# Patient Record
Sex: MALE | Race: BLACK OR AFRICAN AMERICAN | Employment: FULL TIME | URBAN - METROPOLITAN AREA
[De-identification: names, ages, dates, MRNs, and addresses within clinical notes are randomized per-mention and may not be internally consistent; named-entity substitution may affect disease eponyms.]

---

## 2019-04-17 ENCOUNTER — HOSPITAL ENCOUNTER (EMERGENCY)
Age: 19
Discharge: HOME | End: 2019-04-17
Attending: EMERGENCY MEDICINE | Admitting: EMERGENCY MEDICINE
Payer: MEDICAID

## 2019-04-17 VITALS
SYSTOLIC BLOOD PRESSURE: 136 MMHG | DIASTOLIC BLOOD PRESSURE: 85 MMHG | OXYGEN SATURATION: 100 % | HEART RATE: 77 BPM | BODY MASS INDEX: 21.18 KG/M2 | TEMPERATURE: 98 F | HEIGHT: 69 IN | RESPIRATION RATE: 17 BRPM | WEIGHT: 143 LBS

## 2019-04-17 DIAGNOSIS — K02.9 DENTAL CARIES: ICD-10-CM

## 2019-04-17 DIAGNOSIS — R22.0 RIGHT FACIAL SWELLING: ICD-10-CM

## 2019-04-17 DIAGNOSIS — K08.89 DENTALGIA: Primary | ICD-10-CM

## 2019-04-17 PROCEDURE — 74011250636 HC RX REV CODE- 250/636: Performed by: EMERGENCY MEDICINE

## 2019-04-17 PROCEDURE — 99283 EMERGENCY DEPT VISIT LOW MDM: CPT

## 2019-04-17 PROCEDURE — 74011250637 HC RX REV CODE- 250/637: Performed by: PHYSICIAN ASSISTANT

## 2019-04-17 RX ORDER — HYDROCODONE BITARTRATE AND ACETAMINOPHEN 5; 325 MG/1; MG/1
1 TABLET ORAL
Status: COMPLETED | OUTPATIENT
Start: 2019-04-17 | End: 2019-04-17

## 2019-04-17 RX ORDER — HYDROCODONE BITARTRATE AND ACETAMINOPHEN 5; 325 MG/1; MG/1
1 TABLET ORAL
Qty: 12 TAB | Refills: 0 | Status: SHIPPED | OUTPATIENT
Start: 2019-04-17 | End: 2019-04-20

## 2019-04-17 RX ORDER — CLINDAMYCIN HYDROCHLORIDE 300 MG/1
300 CAPSULE ORAL 4 TIMES DAILY
Qty: 28 CAP | Refills: 0 | Status: SHIPPED | OUTPATIENT
Start: 2019-04-17 | End: 2019-04-24

## 2019-04-17 RX ORDER — DEXAMETHASONE 4 MG/1
12 TABLET ORAL
Status: COMPLETED | OUTPATIENT
Start: 2019-04-17 | End: 2019-04-17

## 2019-04-17 RX ORDER — CLINDAMYCIN HYDROCHLORIDE 150 MG/1
300 CAPSULE ORAL
Status: COMPLETED | OUTPATIENT
Start: 2019-04-17 | End: 2019-04-17

## 2019-04-17 RX ORDER — CHLORHEXIDINE GLUCONATE 1.2 MG/ML
15 RINSE ORAL 2 TIMES DAILY
Qty: 420 ML | Refills: 0 | Status: SHIPPED | OUTPATIENT
Start: 2019-04-17 | End: 2019-05-01

## 2019-04-17 RX ADMIN — CLINDAMYCIN HYDROCHLORIDE 300 MG: 150 CAPSULE ORAL at 18:41

## 2019-04-17 RX ADMIN — DEXAMETHASONE 12 MG: 4 TABLET ORAL at 19:18

## 2019-04-17 RX ADMIN — HYDROCODONE BITARTRATE AND ACETAMINOPHEN 1 TABLET: 5; 325 TABLET ORAL at 19:18

## 2019-04-17 NOTE — ED PROVIDER NOTES
EMERGENCY DEPARTMENT HISTORY AND PHYSICAL EXAM    Date: 4/17/2019  Patient Name: Raf Mckinney    History of Presenting Illness     Chief Complaint   Patient presents with    Dental Pain    Facial Swelling         History Provided By: Patient and mother     Chief Complaint: Dental pain and right cheek swelling   Duration: 1 day   Timing:  Acute   Location: right cheek and tooth 13  Quality: Aching and pressure   Severity: 9/10   Modifying Factors: none    Associated Symptoms: none       Additional History (Context): Raf Mckinney is a 22 y.o. male with no medical history who presents today for a 1 day history of right upper dental pain and right cheek swelling. Patient states he woke up this way after an afternoon nap. Patient has call the dentist but did not get until next week. Patient denies history of this. Denies fevers, chills, nausea or vomiting. Patient denies any trismus, tongue elevation, shortness of breath or changes in voice. Patient denies any drooling. Patient has not tried anything for this at home. PCP: Moshe Regalado MD    Current Facility-Administered Medications   Medication Dose Route Frequency Provider Last Rate Last Dose    dexamethasone (DECADRON) tablet 12 mg  12 mg Oral NOW Yane Breen MD         Current Outpatient Medications   Medication Sig Dispense Refill    clindamycin (CLEOCIN) 300 mg capsule Take 1 Cap by mouth four (4) times daily for 7 days. 28 Cap 0    chlorhexidine (PERIDEX) 0.12 % solution 15 mL by Swish and Spit route two (2) times a day for 14 days. 420 mL 0    HYDROcodone-acetaminophen (NORCO) 5-325 mg per tablet Take 1 Tab by mouth every six (6) hours as needed for Pain for up to 3 days. Max Daily Amount: 4 Tabs. 12 Tab 0       Past History     Past Medical History:  History reviewed. No pertinent past medical history. Past Surgical History:  History reviewed. No pertinent surgical history.     Family History:  History reviewed. No pertinent family history. Social History:  Social History     Tobacco Use    Smoking status: Never Smoker    Smokeless tobacco: Never Used   Substance Use Topics    Alcohol use: Not on file    Drug use: Not on file       Allergies:  No Known Allergies      Review of Systems   Review of Systems   Constitutional: Negative for chills and fever. HENT: Positive for dental problem. Negative for congestion, rhinorrhea and sore throat. Respiratory: Negative for cough and shortness of breath. Cardiovascular: Negative for chest pain. Gastrointestinal: Negative for abdominal pain, blood in stool, constipation, diarrhea, nausea and vomiting. Genitourinary: Negative for dysuria, frequency and hematuria. Musculoskeletal: Negative for back pain and myalgias. Skin: Negative for rash and wound. Neurological: Negative for dizziness and headaches. All other systems reviewed and are negative. All Other Systems Negative  Physical Exam     Vitals:    04/17/19 1752   BP: 136/85   Pulse: 77   Resp: 17   Temp: 98 °F (36.7 °C)   SpO2: 100%   Weight: 64.9 kg (143 lb)   Height: 5' 9\" (1.753 m)     Physical Exam   Constitutional: He is oriented to person, place, and time. He appears well-developed and well-nourished. No distress. HENT:   Head: Normocephalic and atraumatic. Mouth/Throat: Uvula is midline, oropharynx is clear and moist and mucous membranes are normal. No trismus in the jaw. Normal dentition. Dental caries present. No dental abscesses or uvula swelling. Eyes: Conjunctivae are normal.   Neck: Normal range of motion. Neck supple. Cardiovascular: Normal rate, regular rhythm and normal heart sounds. Pulmonary/Chest: Effort normal and breath sounds normal. No respiratory distress. He exhibits no tenderness. Abdominal: Soft. Bowel sounds are normal. He exhibits no distension. There is no tenderness. There is no rebound and no guarding. Musculoskeletal: Normal range of motion. He exhibits no edema or deformity. Neurological: He is alert and oriented to person, place, and time. Skin: Skin is warm and dry. He is not diaphoretic. Psychiatric: He has a normal mood and affect. Nursing note and vitals reviewed. Diagnostic Study Results     Labs -   No results found for this or any previous visit (from the past 12 hour(s)). Radiologic Studies -   No orders to display     CT Results  (Last 48 hours)    None        CXR Results  (Last 48 hours)    None            Medical Decision Making   I am the first provider for this patient. I reviewed the vital signs, available nursing notes, past medical history, past surgical history, family history and social history. Vital Signs-Reviewed the patient's vital signs. Pulse Oximetry Analysis -  100 % RA    Records Reviewed: Nursing Notes and Old Medical Records     Procedures: None   Procedures    Provider Notes (Medical Decision Making):     DDx: Odontalgia, Dental caries,  Periodontal disease, dental fracture, gingivitis Periapical abscess, Trigeminal neuralgia,  space infection, George's angina, Retropharyngeal space infection, Infection after root canal, Dry Socket, Acute Necrotizing Ulcerative Gingivitis (ANUG). Plan: No  abscess at this time. Will give dose of antibiotics prior to discharge. Have stressed the importance of follow up with dentist, have advised patient to use tylenol and motrin as needed for pain and additional pain medication for breakthrough pain, will discharge home with mouth wash, abx and pain meds. Patient agrees with the plan and management and states all questions have been thoroughly answered and there are no more remaining questions. Return precautions have been given.           MED RECONCILIATION:  Current Facility-Administered Medications   Medication Dose Route Frequency    dexamethasone (DECADRON) tablet 12 mg  12 mg Oral NOW     Current Outpatient Medications   Medication Sig    clindamycin (CLEOCIN) 300 mg capsule Take 1 Cap by mouth four (4) times daily for 7 days.  chlorhexidine (PERIDEX) 0.12 % solution 15 mL by Swish and Spit route two (2) times a day for 14 days.  HYDROcodone-acetaminophen (NORCO) 5-325 mg per tablet Take 1 Tab by mouth every six (6) hours as needed for Pain for up to 3 days. Max Daily Amount: 4 Tabs. Disposition:  Home     DISCHARGE NOTE:   Pt has been reexamined. Patient has no new complaints, changes, or physical findings. Care plan outlined and precautions discussed. Results of workup were reviewed with the patient. All medications were reviewed with the patient. All of pt's questions and concerns were addressed. Patient was instructed and agrees to follow up with dentist as well as to return to the ED upon further deterioration. Patient is ready to go home. Follow-up Information     Follow up With Specialties Details Why Contact Info    [facility] EMERGENCY DEPT Emergency Medicine  As needed [address] [place] [address]   In 2 days  Xavier [place] [address] [facility]          Current Discharge Medication List      START taking these medications    Details   clindamycin (CLEOCIN) 300 mg capsule Take 1 Cap by mouth four (4) times daily for 7 days. Qty: 28 Cap, Refills: 0    Associated Diagnoses: Katiana Slocumb; Right facial swelling      chlorhexidine (PERIDEX) 0.12 % solution 15 mL by Swish and Spit route two (2) times a day for 14 days. Qty: 420 mL, Refills: 0      HYDROcodone-acetaminophen (NORCO) 5-325 mg per tablet Take 1 Tab by mouth every six (6) hours as needed for Pain for up to 3 days. Max Daily Amount: 4 Tabs. Qty: 12 Tab, Refills: 0    Associated Diagnoses: Katiana Slocumb; Dental caries; Right facial swelling                 Diagnosis     Clinical Impression:   1. Dentalgia    2. Dental caries    3.  Right facial swelling

## 2019-04-17 NOTE — DISCHARGE INSTRUCTIONS
Patient Education        Tooth Decay: Care Instructions  Your Care Instructions    Tooth decay is damage to a tooth caused by plaque. Plaque is a thin film of bacteria that sticks to the teeth above and below the gum line. If plaque isn't removed from the teeth, it can build up and harden into tartar. The bacteria in plaque and tartar use sugars in food to make acids. These acids can cause tooth decay and gum disease. Any part of your tooth can decay, from the roots below the gum line to the chewing surface. Decay can affect the outer layer (enamel) or inner layer (dentin) of your teeth. The deeper the decay, the worse the damage. Untreated tooth decay will get worse and may lead to tooth loss. If you have a small hole (cavity) in your tooth, your dentist can repair it by removing the decay and filling the hole. If you have deeper decay, you may need more treatment. A very badly damaged tooth may have to be removed. Follow-up care is a key part of your treatment and safety. Be sure to make and go to all appointments, and call your dentist if you are having problems. It's also a good idea to know your test results and keep a list of the medicines you take. How can you care for yourself at home? If you have pain:  · Take an over-the-counter pain medicine, such as acetaminophen (Tylenol), ibuprofen (Advil, Motrin), or naproxen (Aleve). Be safe with medicines. Read and follow all instructions on the label. ? Do not take two or more pain medicines at the same time unless the doctor told you to. Many pain medicines have acetaminophen, which is Tylenol. Too much acetaminophen (Tylenol) can be harmful. · Put ice or a cold pack on your cheek over the tooth for 10 to 15 minutes at a time. Put a thin cloth between the ice and your skin. To prevent tooth decay  · Brush teeth twice a day, and floss once a day. Brushing with fluoride toothpaste and flossing may be enough to reverse early decay.   · Use a toothbrush with soft, rounded-end bristles and a head that is small enough to reach all parts of your teeth and mouth. Replace your toothbrush every 3 or 4 months. You may also use an electric toothbrush that has rotating and oscillating (back-and-forth) action. · Ask your dentist about having fluoride treatments at the dental office. · Brush your tongue to help get rid of bacteria. · Eat healthy foods that include whole grains, vegetables, and fruits. · Have your teeth cleaned by a professional at least two times a year. · Do not smoke or use smokeless tobacco. Tobacco can make tooth decay worse. When should you call for help? Call 911 anytime you think you may need emergency care. For example, call if:    · You have trouble breathing.    Call your dentist now or seek immediate medical care if:    · You have new or worse symptoms of infection, such as:  ? Increased pain, swelling, warmth, or redness. ? Red streaks leading from the area. ? Pus draining from the area. ? A fever.    Watch closely for changes in your health, and be sure to contact your doctor if:    · You do not get better as expected. Where can you learn more? Go to http://avHotelicoptercurt.info/. Enter U706 in the search box to learn more about \"Tooth Decay: Care Instructions. \"  Current as of: March 27, 2018  Content Version: 11.9  © 5663-6660 [employer]. Care instructions adapted under license by [employer] (which disclaims liability or warranty for this information). If you have questions about a medical condition or this instruction, always ask your healthcare professional. [place] [address] any warranty or liability for your use of this information. Patient Education        Periodontal Conditions: Care Instructions  Your Care Instructions    Periodontal conditions affect the gums, bone, and tissue that surround and support the teeth. The most common problems are caused by plaque. Plaque is a thin film of bacteria that sticks to teeth above and below the gum line. It can build up and harden into tartar. The bacteria in plaque and tartar can cause gum disease. Gingivitis is a disease that affects the gums (gingiva). The gums are the soft tissue that surrounds the teeth. Gingivitis causes red, swollen, tender gums that bleed easily when brushed, persistent bad breath, and sensitive teeth. Because it is not painful, many people do not get treatment when they should. Gingivitis can be reversed with good dental care. Periodontitis is a more advanced disease that affects more than the gums. The gums pull away from the teeth. This leaves deep pockets where bacteria can grow. The disease can damage the bones that support the teeth. The teeth may get loose and fall out. A periodontal condition should be treated as soon as it is found. Finding gum problems early, treating them right away, and having regular checkups bring the best results. You can treat mild periodontal conditions by brushing and flossing your teeth every day. Your dentist may prescribe a mouthwash to kill the bacteria that can damage teeth and gums. Your dentist may have you take antibiotics to treat infection from moderate periodontal disease. If your gums have pulled away from your teeth, you may need cleaning between the teeth and gums right down to the teeth roots. This is called root planing and scaling. If you have severe periodontal disease, you may need surgery to remove diseased gum tissue or repair bone damage. Follow-up care is a key part of your treatment and safety. Be sure to make and go to all appointments, and call your dentist if you are having problems. It's also a good idea to know your test results and keep a list of the medicines you take. How can you care for yourself at home? · If your dentist prescribed antibiotics, take them as directed. Do not stop taking them just because you feel better.  You need to take the full course of antibiotics. · Brush your teeth twice a day, in the morning and at night. ? Use a toothbrush with soft, rounded-end bristles and a head that is small enough to reach all parts of your teeth and mouth. Replace your toothbrush every 3 to 4 months. ? Use a fluoride toothpaste. ? Place the brush at a 45-degree angle where the teeth meet the gums. Press firmly, and gently rock the brush back and forth using small circular movements. ? Brush chewing surfaces vigorously with short back-and-forth strokes. ? Brush your tongue from back to front. · Floss at least once a day. Choose the type and flavor that you like best.  · Have your teeth cleaned by a professional at least twice a year. · Ask your dentist about using an antibacterial mouthwash to help reduce bacteria. · Rinse your mouth with water or chew sugar-free gum after meals if you can't brush your teeth. · Do not smoke or use smokeless tobacco. Tobacco use can cause periodontal disease. When should you call for help? Call your dentist now or seek immediate medical care if:    · You have symptoms of infection, such as:  ? Increased pain, swelling, warmth, or redness. ? Red streaks leading from the area. ? Pus draining from the area. ? A fever.    Watch closely for changes in your health, and be sure to contact your dentist if:    · You have new or worse tooth pain.     · You do not get better as expected. Where can you learn more? Go to http://av-curt.info/. Enter T957 in the search box to learn more about \"Periodontal Conditions: Care Instructions. \"  Current as of: March 27, 2018  Content Version: 11.9  © 0784-1108 [employer]. Care instructions adapted under license by [employer] (which disclaims liability or warranty for this information).  If you have questions about a medical condition or this instruction, always ask your healthcare professional. Rico Subramanian disclaims any warranty or liability for your use of this information.

## 2019-05-23 ENCOUNTER — HOSPITAL ENCOUNTER (EMERGENCY)
Age: 19
Discharge: HOME | End: 2019-05-23
Attending: EMERGENCY MEDICINE
Payer: MEDICAID

## 2019-05-23 VITALS
OXYGEN SATURATION: 100 % | BODY MASS INDEX: 21.18 KG/M2 | DIASTOLIC BLOOD PRESSURE: 76 MMHG | SYSTOLIC BLOOD PRESSURE: 131 MMHG | WEIGHT: 143 LBS | TEMPERATURE: 99.2 F | HEART RATE: 68 BPM | HEIGHT: 69 IN | RESPIRATION RATE: 16 BRPM

## 2019-05-23 DIAGNOSIS — R10.84 COLICKY ABDOMINAL PAIN: Primary | ICD-10-CM

## 2019-05-23 DIAGNOSIS — R79.89 ELEVATED SERUM CREATININE: ICD-10-CM

## 2019-05-23 LAB
ALBUMIN SERPL-MCNC: 4 G/DL (ref 3.4–5)
ALBUMIN/GLOB SERPL: 1.1 {RATIO} (ref 0.8–1.7)
ALP SERPL-CCNC: 63 U/L (ref 45–117)
ALT SERPL-CCNC: 21 U/L (ref 16–61)
ANION GAP SERPL CALC-SCNC: 6 MMOL/L (ref 3–18)
AST SERPL-CCNC: 18 U/L (ref 15–37)
BASOPHILS # BLD: 0 K/UL (ref 0–0.1)
BASOPHILS NFR BLD: 1 % (ref 0–2)
BILIRUB SERPL-MCNC: 0.4 MG/DL (ref 0.2–1)
BUN SERPL-MCNC: 11 MG/DL (ref 7–18)
BUN/CREAT SERPL: 8 (ref 12–20)
CALCIUM SERPL-MCNC: 9 MG/DL (ref 8.5–10.1)
CHLORIDE SERPL-SCNC: 106 MMOL/L (ref 100–108)
CO2 SERPL-SCNC: 30 MMOL/L (ref 21–32)
CREAT SERPL-MCNC: 1.4 MG/DL (ref 0.6–1.3)
DIFFERENTIAL METHOD BLD: ABNORMAL
EOSINOPHIL # BLD: 0.1 K/UL (ref 0–0.4)
EOSINOPHIL NFR BLD: 2 % (ref 0–5)
ERYTHROCYTE [DISTWIDTH] IN BLOOD BY AUTOMATED COUNT: 12.9 % (ref 11.6–14.5)
GLOBULIN SER CALC-MCNC: 3.7 G/DL (ref 2–4)
GLUCOSE SERPL-MCNC: 76 MG/DL (ref 74–99)
HCT VFR BLD AUTO: 40.4 % (ref 36–48)
HGB BLD-MCNC: 13.7 G/DL (ref 13–16)
LIPASE SERPL-CCNC: 146 U/L (ref 73–393)
LYMPHOCYTES # BLD: 2.3 K/UL (ref 0.9–3.6)
LYMPHOCYTES NFR BLD: 41 % (ref 21–52)
MCH RBC QN AUTO: 30.6 PG (ref 24–34)
MCHC RBC AUTO-ENTMCNC: 33.9 G/DL (ref 31–37)
MCV RBC AUTO: 90.2 FL (ref 74–97)
MONOCYTES # BLD: 0.5 K/UL (ref 0.05–1.2)
MONOCYTES NFR BLD: 9 % (ref 3–10)
NEUTS SEG # BLD: 2.7 K/UL (ref 1.8–8)
NEUTS SEG NFR BLD: 47 % (ref 40–73)
PLATELET # BLD AUTO: 234 K/UL (ref 135–420)
PMV BLD AUTO: 11.1 FL (ref 9.2–11.8)
POTASSIUM SERPL-SCNC: 4.3 MMOL/L (ref 3.5–5.5)
PROT SERPL-MCNC: 7.7 G/DL (ref 6.4–8.2)
RBC # BLD AUTO: 4.48 M/UL (ref 4.7–5.5)
SODIUM SERPL-SCNC: 142 MMOL/L (ref 136–145)
WBC # BLD AUTO: 5.5 K/UL (ref 4.6–13.2)

## 2019-05-23 PROCEDURE — 96361 HYDRATE IV INFUSION ADD-ON: CPT

## 2019-05-23 PROCEDURE — 80053 COMPREHEN METABOLIC PANEL: CPT

## 2019-05-23 PROCEDURE — 96374 THER/PROPH/DIAG INJ IV PUSH: CPT

## 2019-05-23 PROCEDURE — 74011250636 HC RX REV CODE- 250/636: Performed by: EMERGENCY MEDICINE

## 2019-05-23 PROCEDURE — 85025 COMPLETE CBC W/AUTO DIFF WBC: CPT

## 2019-05-23 PROCEDURE — 83690 ASSAY OF LIPASE: CPT

## 2019-05-23 PROCEDURE — 99283 EMERGENCY DEPT VISIT LOW MDM: CPT

## 2019-05-23 RX ORDER — ONDANSETRON 2 MG/ML
4 INJECTION INTRAMUSCULAR; INTRAVENOUS
Status: COMPLETED | OUTPATIENT
Start: 2019-05-23 | End: 2019-05-23

## 2019-05-23 RX ORDER — ONDANSETRON 4 MG/1
8 TABLET, FILM COATED ORAL
Qty: 12 TAB | Refills: 0 | Status: SHIPPED | OUTPATIENT
Start: 2019-05-23 | End: 2019-11-30

## 2019-05-23 RX ORDER — DICYCLOMINE HYDROCHLORIDE 10 MG/1
10 CAPSULE ORAL 4 TIMES DAILY
Qty: 20 CAP | Refills: 0 | Status: SHIPPED | OUTPATIENT
Start: 2019-05-23 | End: 2019-05-28

## 2019-05-23 RX ADMIN — SODIUM CHLORIDE 1000 ML: 900 INJECTION, SOLUTION INTRAVENOUS at 19:20

## 2019-05-23 RX ADMIN — ONDANSETRON 4 MG: 2 SOLUTION INTRAMUSCULAR; INTRAVENOUS at 19:49

## 2019-05-23 NOTE — ED TRIAGE NOTES
2 weeks of upper abdominal pain. Hurts after eating or drinking. Relates pain back to eating [employer].  Diarrhea once a day

## 2019-05-23 NOTE — ED PROVIDER NOTES
EMERGENCY DEPARTMENT HISTORY AND PHYSICAL EXAM    Date: 5/23/2019  Patient Name: Ayden Holloway    History of Presenting Illness     Chief Complaint   Patient presents with    Abdominal Pain    Diarrhea         History Provided By: Patient and Patient's Mother        Additional History (Context): Ayden Holloway is a 22 y.o. male with No significant past medical history who presents with intermittent abdominal pain after eating. He said this as well as loose stools for the past 2 weeks. Denies vomiting though feels nauseated. Denies blood in his stool dysuria fever or flank pain. Denies prior abdominal surgeries. Denies sick contacts, recent antibiotics well water use fresh water exposure recent hot tubs. PCP: Ileana Miller MD    Current Facility-Administered Medications   Medication Dose Route Frequency Provider Last Rate Last Dose    sodium chloride 0.9 % bolus infusion 1,000 mL  1,000 mL IntraVENous ONCE Magaly Dixon PA 1,000 mL/hr at 05/23/19 1920 1,000 mL at 05/23/19 1920     Current Outpatient Medications   Medication Sig Dispense Refill    dicyclomine (BENTYL) 10 mg capsule Take 1 Cap by mouth four (4) times daily for 5 days. 20 Cap 0    ondansetron hcl (ZOFRAN) 4 mg tablet Take 2 Tabs by mouth every eight (8) hours as needed for Nausea. 12 Tab 0       Past History     Past Medical History:  History reviewed. No pertinent past medical history. Past Surgical History:  History reviewed. No pertinent surgical history. Family History:  History reviewed. No pertinent family history. Social History:  Social History     Tobacco Use    Smoking status: Never Smoker    Smokeless tobacco: Never Used   Substance Use Topics    Alcohol use: Not on file    Drug use: Not on file       Allergies:  No Known Allergies      Review of Systems   Review of Systems   Constitutional: Negative for fatigue, fever and unexpected weight change.    Gastrointestinal: Positive for abdominal pain, diarrhea and nausea. Negative for blood in stool and vomiting. All Other Systems Negative  Physical Exam     Vitals:    05/23/19 1831   BP: 131/76   Pulse: 68   Resp: 16   Temp: 99.2 °F (37.3 °C)   SpO2: 100%   Weight: 64.9 kg (143 lb)   Height: 5' 9\" (1.753 m)     Physical Exam   Constitutional: Vital signs are normal. He appears well-developed and well-nourished. He is active. Non-toxic appearance. He does not appear ill. No distress. HENT:   Head: Normocephalic and atraumatic. Neck: Normal range of motion. Neck supple. Carotid bruit is not present. No tracheal deviation present. No thyromegaly present. Cardiovascular: Normal rate, regular rhythm and normal heart sounds. Exam reveals no gallop and no friction rub. No murmur heard. Pulmonary/Chest: Effort normal and breath sounds normal. No stridor. No respiratory distress. He has no wheezes. He has no rales. He exhibits no tenderness. Abdominal: Soft. He exhibits no distension and no mass. There is no tenderness. There is no rebound, no guarding and no CVA tenderness. Musculoskeletal: Normal range of motion. Neurological: He is alert. Skin: Skin is warm, dry and intact. He is not diaphoretic. No pallor. Psychiatric: He has a normal mood and affect. His speech is normal and behavior is normal. Judgment and thought content normal.   Nursing note and vitals reviewed.          Diagnostic Study Results     Labs -     Recent Results (from the past 12 hour(s))   CBC WITH AUTOMATED DIFF    Collection Time: 05/23/19  7:25 PM   Result Value Ref Range    WBC 5.5 4.6 - 13.2 K/uL    RBC 4.48 (L) 4.70 - 5.50 M/uL    HGB 13.7 13.0 - 16.0 g/dL    HCT 40.4 36.0 - 48.0 %    MCV 90.2 74.0 - 97.0 FL    MCH 30.6 24.0 - 34.0 PG    MCHC 33.9 31.0 - 37.0 g/dL    RDW 12.9 11.6 - 14.5 %    PLATELET 469 167 - 934 K/uL    MPV 11.1 9.2 - 11.8 FL    NEUTROPHILS 47 40 - 73 %    LYMPHOCYTES 41 21 - 52 %    MONOCYTES 9 3 - 10 %    EOSINOPHILS 2 0 - 5 %    BASOPHILS 1 0 - 2 %    ABS. NEUTROPHILS 2.7 1.8 - 8.0 K/UL    ABS. LYMPHOCYTES 2.3 0.9 - 3.6 K/UL    ABS. MONOCYTES 0.5 0.05 - 1.2 K/UL    ABS. EOSINOPHILS 0.1 0.0 - 0.4 K/UL    ABS. BASOPHILS 0.0 0.0 - 0.1 K/UL    DF AUTOMATED     METABOLIC PANEL, COMPREHENSIVE    Collection Time: 05/23/19  7:25 PM   Result Value Ref Range    Sodium 142 136 - 145 mmol/L    Potassium 4.3 3.5 - 5.5 mmol/L    Chloride 106 100 - 108 mmol/L    CO2 30 21 - 32 mmol/L    Anion gap 6 3.0 - 18 mmol/L    Glucose 76 74 - 99 mg/dL    BUN 11 7.0 - 18 MG/DL    Creatinine 1.40 (H) 0.6 - 1.3 MG/DL    BUN/Creatinine ratio 8 (L) 12 - 20      GFR est AA >60 >60 ml/min/1.73m2    GFR est non-AA >60 >60 ml/min/1.73m2    Calcium 9.0 8.5 - 10.1 MG/DL    Bilirubin, total 0.4 0.2 - 1.0 MG/DL    ALT (SGPT) 21 16 - 61 U/L    AST (SGOT) 18 15 - 37 U/L    Alk. phosphatase 63 45 - 117 U/L    Protein, total 7.7 6.4 - 8.2 g/dL    Albumin 4.0 3.4 - 5.0 g/dL    Globulin 3.7 2.0 - 4.0 g/dL    A-G Ratio 1.1 0.8 - 1.7     LIPASE    Collection Time: 05/23/19  7:25 PM   Result Value Ref Range    Lipase 146 73 - 393 U/L       Radiologic Studies -   No orders to display     CT Results  (Last 48 hours)    None        CXR Results  (Last 48 hours)    None            Medical Decision Making   I am the first provider for this patient. I reviewed the vital signs, available nursing notes, past medical history, past surgical history, family history and social history. Vital Signs-Reviewed the patient's vital signs. Records Reviewed: Nursing Notes    Procedures:  Procedures    Provider Notes (Medical Decision Making): Vital signs stable. Elevated creatinine however and have given IV fluids here will refer to GI and PCP for follow-up.     MED RECONCILIATION:  Current Facility-Administered Medications   Medication Dose Route Frequency    sodium chloride 0.9 % bolus infusion 1,000 mL  1,000 mL IntraVENous ONCE     Current Outpatient Medications   Medication Sig    dicyclomine (BENTYL) 10 mg capsule Take 1 Cap by mouth four (4) times daily for 5 days.  ondansetron hcl (ZOFRAN) 4 mg tablet Take 2 Tabs by mouth every eight (8) hours as needed for Nausea. Disposition:  home    DISCHARGE NOTE:   8:09 PM    Pt has been reexamined. Patient has no new complaints, changes, or physical findings. Care plan outlined and precautions discussed. Results of labs were reviewed with the patient. All medications were reviewed with the patient; will d/c home with bentylclifffran. All of pt's questions and concerns were addressed. Patient was instructed and agrees to follow up with GI, PCP, as well as to return to the ED upon further deterioration. Patient is ready to go home. Follow-up Information     Follow up With Specialties Details Why Contact Info    Jalen Colbert MD [facility] Schedule an appointment as soon as possible for a visit in 1 day  [facility] [address] [facility] [address]      Ilsa Lew MD Gastroenterology Schedule an appointment as soon as possible for a visit in 1 day  [address] [facility]  [address] [facility] [address] [place] [address] DEPT Emergency Medicine  If symptoms worsen return immediately [address] [facility]  [phone]          Current Discharge Medication List      START taking these medications    Details   dicyclomine (BENTYL) 10 mg capsule Take 1 Cap by mouth four (4) times daily for 5 days. Qty: 20 Cap, Refills: 0      ondansetron hcl (ZOFRAN) 4 mg tablet Take 2 Tabs by mouth every eight (8) hours as needed for Nausea. Qty: 12 Tab, Refills: 0           Diagnosis     Clinical Impression:   1. Colicky abdominal pain    2.  Elevated serum creatinine

## 2019-05-24 NOTE — DISCHARGE INSTRUCTIONS

## 2019-11-30 ENCOUNTER — APPOINTMENT (OUTPATIENT)
Dept: GENERAL RADIOLOGY | Age: 19
End: 2019-11-30
Attending: PHYSICIAN ASSISTANT
Payer: MEDICAID

## 2019-11-30 ENCOUNTER — HOSPITAL ENCOUNTER (OUTPATIENT)
Age: 19
Setting detail: OBSERVATION
Discharge: HOME | End: 2019-12-02
Attending: EMERGENCY MEDICINE | Admitting: HOSPITALIST
Payer: MEDICAID

## 2019-11-30 ENCOUNTER — APPOINTMENT (OUTPATIENT)
Dept: CT IMAGING | Age: 19
End: 2019-11-30
Attending: PHYSICIAN ASSISTANT
Payer: MEDICAID

## 2019-11-30 DIAGNOSIS — J93.9 PNEUMOTHORAX ON LEFT: ICD-10-CM

## 2019-11-30 DIAGNOSIS — J98.2 PNEUMOMEDIASTINUM (HCC): Primary | ICD-10-CM

## 2019-11-30 PROBLEM — N18.2 CHRONIC RENAL FAILURE, STAGE 2 (MILD): Status: ACTIVE | Noted: 2019-11-30

## 2019-11-30 LAB
ANION GAP SERPL CALC-SCNC: 5 MMOL/L (ref 3–18)
BASOPHILS # BLD: 0 K/UL (ref 0–0.1)
BASOPHILS NFR BLD: 0 % (ref 0–2)
BUN SERPL-MCNC: 15 MG/DL (ref 7–18)
BUN/CREAT SERPL: 11 (ref 12–20)
CALCIUM SERPL-MCNC: 9.6 MG/DL (ref 8.5–10.1)
CHLORIDE SERPL-SCNC: 104 MMOL/L (ref 100–111)
CK MB CFR SERPL CALC: NORMAL % (ref 0–4)
CK MB SERPL-MCNC: <1 NG/ML (ref 5–25)
CK SERPL-CCNC: 155 U/L (ref 39–308)
CO2 SERPL-SCNC: 29 MMOL/L (ref 21–32)
CREAT SERPL-MCNC: 1.34 MG/DL (ref 0.6–1.3)
DIFFERENTIAL METHOD BLD: ABNORMAL
EOSINOPHIL # BLD: 0 K/UL (ref 0–0.4)
EOSINOPHIL NFR BLD: 0 % (ref 0–5)
ERYTHROCYTE [DISTWIDTH] IN BLOOD BY AUTOMATED COUNT: 13 % (ref 11.6–14.5)
GLUCOSE SERPL-MCNC: 85 MG/DL (ref 74–99)
HCT VFR BLD AUTO: 43 % (ref 36–48)
HGB BLD-MCNC: 14.8 G/DL (ref 13–16)
LYMPHOCYTES # BLD: 1.6 K/UL (ref 0.9–3.6)
LYMPHOCYTES NFR BLD: 21 % (ref 21–52)
MCH RBC QN AUTO: 31.2 PG (ref 24–34)
MCHC RBC AUTO-ENTMCNC: 34.4 G/DL (ref 31–37)
MCV RBC AUTO: 90.5 FL (ref 74–97)
MONOCYTES # BLD: 0.9 K/UL (ref 0.05–1.2)
MONOCYTES NFR BLD: 12 % (ref 3–10)
NEUTS SEG # BLD: 5.2 K/UL (ref 1.8–8)
NEUTS SEG NFR BLD: 67 % (ref 40–73)
PLATELET # BLD AUTO: 248 K/UL (ref 135–420)
PMV BLD AUTO: 11.3 FL (ref 9.2–11.8)
POTASSIUM SERPL-SCNC: 4 MMOL/L (ref 3.5–5.5)
RBC # BLD AUTO: 4.75 M/UL (ref 4.7–5.5)
SODIUM SERPL-SCNC: 138 MMOL/L (ref 136–145)
TROPONIN I SERPL-MCNC: <0.02 NG/ML (ref 0–0.04)
WBC # BLD AUTO: 7.7 K/UL (ref 4.6–13.2)

## 2019-11-30 PROCEDURE — 71046 X-RAY EXAM CHEST 2 VIEWS: CPT

## 2019-11-30 PROCEDURE — 99218 HC RM OBSERVATION: CPT

## 2019-11-30 PROCEDURE — 99285 EMERGENCY DEPT VISIT HI MDM: CPT

## 2019-11-30 PROCEDURE — 77010033711 HC HIGH FLOW OXYGEN

## 2019-11-30 PROCEDURE — 74011636320 HC RX REV CODE- 636/320: Performed by: EMERGENCY MEDICINE

## 2019-11-30 PROCEDURE — 71260 CT THORAX DX C+: CPT

## 2019-11-30 PROCEDURE — 80048 BASIC METABOLIC PNL TOTAL CA: CPT

## 2019-11-30 PROCEDURE — 93005 ELECTROCARDIOGRAM TRACING: CPT

## 2019-11-30 PROCEDURE — 94761 N-INVAS EAR/PLS OXIMETRY MLT: CPT

## 2019-11-30 PROCEDURE — 85025 COMPLETE CBC W/AUTO DIFF WBC: CPT

## 2019-11-30 PROCEDURE — 74011250637 HC RX REV CODE- 250/637: Performed by: HOSPITALIST

## 2019-11-30 PROCEDURE — 82550 ASSAY OF CK (CPK): CPT

## 2019-11-30 RX ORDER — ACETAMINOPHEN 325 MG/1
650 TABLET ORAL
Status: DISCONTINUED | OUTPATIENT
Start: 2019-11-30 | End: 2019-12-02 | Stop reason: HOSPADM

## 2019-11-30 RX ORDER — OXYCODONE AND ACETAMINOPHEN 5; 325 MG/1; MG/1
1 TABLET ORAL
Status: DISCONTINUED | OUTPATIENT
Start: 2019-11-30 | End: 2019-12-02 | Stop reason: HOSPADM

## 2019-11-30 RX ADMIN — ACETAMINOPHEN 650 MG: 325 TABLET, FILM COATED ORAL at 17:55

## 2019-11-30 RX ADMIN — IOPAMIDOL 80 ML: 612 INJECTION, SOLUTION INTRAVENOUS at 13:16

## 2019-11-30 NOTE — ED PROVIDER NOTES
[facility] EMERGENCY DEPT      Date: 11/30/2019  Patient Name: Alma Singleton    History of Presenting Illness     Chief Complaint   Patient presents with    Sore Throat    Rib Pain       20 y.o. male otherwise healthy presents the ED for complaints of left-sided rib pain since yesterday. Patient states that he did not have any injury, and was not doing any heavy lifting at work. Pt notes that he is needing a note for work as he called out yesterday. He reports doing a small amount of coughing, with no sputum production. Denies fever, chills, SOB, abdominal pain, drug use, vomiting, or other symptoms at this time. Patient denies any other associated signs or symptoms. Patient denies any other complaints. Nursing notes regarding the HPI and triage nursing notes were reviewed. Prior medical records were reviewed. Past History     Past Medical History:  None     Past Surgical History:  History reviewed. No pertinent surgical history. Family History:  History reviewed. No pertinent family history. Social History:  Social History     Tobacco Use    Smoking status: Never Smoker    Smokeless tobacco: Never Used   Substance Use Topics    Alcohol use: Never     Frequency: Never    Drug use: Never       Allergies:  No Known Allergies    Patient's primary care provider (as noted in EPIC):  Louisa Funes NP    Review of Systems   Constitutional:  Denies malaise, fever, chills. Chest:  + left sided chest pain. Denies injury. Cardiac:  Denies palpitations. Respiratory:  Denies shortness of breath. GI/ABD:  Denies injury, pain, distention, nausea, vomiting, diarrhea. Skin: Denies injury, rash, itching or skin changes. All other systems negative as reviewed.      Visit Vitals  /75   Pulse 94   Temp 98.3 °F (36.8 °C)   Resp 16   Ht 5' 9\" (1.753 m)   Wt 63 kg (139 lb)   SpO2 100%   BMI 20.53 kg/m²       PHYSICAL EXAM:    CONSTITUTIONAL:  Alert, in no apparent distress; well developed;  well nourished. HEAD:  Normocephalic, atraumatic. EYES:  EOMI. Non-icteric sclera. Normal conjunctiva. ENTM: Mouth: mucous membranes moist.  NECK:  Supple  RESPIRATORY:  Chest clear, equal breath sounds, good air movement. CARDIOVASCULAR:  Regular rate and rhythm. No murmurs, rubs, or gallops. NEURO:  Moves all four extremities, and grossly normal motor exam.  SKIN:  No rashes;  Normal for age. PSYCH:  Alert and normal affect. DIFFERENTIAL DIAGNOSES/ MEDICAL DECISION MAKING:   Contusion, pneumothorax, fracture, pneumonia, vs other etiologies.      Recent Results (from the past 12 hour(s))   EKG, 12 LEAD, INITIAL    Collection Time: 11/30/19 11:57 AM   Result Value Ref Range    Ventricular Rate 73 BPM    Atrial Rate 80 BPM    P-R Interval 142 ms    QRS Duration 100 ms    Q-T Interval 336 ms    QTC Calculation (Bezet) 370 ms    Calculated P Axis 68 degrees    Calculated R Axis 79 degrees    Calculated T Axis -85 degrees    Diagnosis       Normal sinus rhythm with sinus arrhythmia  T wave abnormality, consider inferolateral ischemia  Abnormal ECG  No previous ECGs available     EKG, 12 LEAD, SUBSEQUENT    Collection Time: 11/30/19 12:10 PM   Result Value Ref Range    Ventricular Rate 71 BPM    Atrial Rate 71 BPM    P-R Interval 162 ms    QRS Duration 98 ms    Q-T Interval 336 ms    QTC Calculation (Bezet) 365 ms    Calculated P Axis 63 degrees    Calculated R Axis 82 degrees    Calculated T Axis -85 degrees    Diagnosis       Normal sinus rhythm with sinus arrhythmia  T wave abnormality, consider inferior ischemia  T wave abnormality, consider anterolateral ischemia  Abnormal ECG  When compared with ECG of 30-NOV-2019 11:57,  No significant change was found     CARDIAC PANEL,(CK, CKMB & TROPONIN)    Collection Time: 11/30/19 12:15 PM   Result Value Ref Range     39 - 308 U/L    CK - MB <1.0 <3.6 ng/ml    CK-MB Index  0.0 - 4.0 %     CALCULATION NOT PERFORMED WHEN RESULT IS BELOW LINEAR LIMIT    Troponin-I, QT <0.02 0.0 - 0.045 NG/ML   CBC WITH AUTOMATED DIFF    Collection Time: 11/30/19 12:15 PM   Result Value Ref Range    WBC 7.7 4.6 - 13.2 K/uL    RBC 4.75 4.70 - 5.50 M/uL    HGB 14.8 13.0 - 16.0 g/dL    HCT 43.0 36.0 - 48.0 %    MCV 90.5 74.0 - 97.0 FL    MCH 31.2 24.0 - 34.0 PG    MCHC 34.4 31.0 - 37.0 g/dL    RDW 13.0 11.6 - 14.5 %    PLATELET 124 257 - 909 K/uL    MPV 11.3 9.2 - 11.8 FL    NEUTROPHILS 67 40 - 73 %    LYMPHOCYTES 21 21 - 52 %    MONOCYTES 12 (H) 3 - 10 %    EOSINOPHILS 0 0 - 5 %    BASOPHILS 0 0 - 2 %    ABS. NEUTROPHILS 5.2 1.8 - 8.0 K/UL    ABS. LYMPHOCYTES 1.6 0.9 - 3.6 K/UL    ABS. MONOCYTES 0.9 0.05 - 1.2 K/UL    ABS. EOSINOPHILS 0.0 0.0 - 0.4 K/UL    ABS. BASOPHILS 0.0 0.0 - 0.1 K/UL    DF AUTOMATED     METABOLIC PANEL, BASIC    Collection Time: 11/30/19 12:15 PM   Result Value Ref Range    Sodium 138 136 - 145 mmol/L    Potassium 4.0 3.5 - 5.5 mmol/L    Chloride 104 100 - 111 mmol/L    CO2 29 21 - 32 mmol/L    Anion gap 5 3.0 - 18 mmol/L    Glucose 85 74 - 99 mg/dL    BUN 15 7.0 - 18 MG/DL    Creatinine 1.34 (H) 0.6 - 1.3 MG/DL    BUN/Creatinine ratio 11 (L) 12 - 20      GFR est AA >60 >60 ml/min/1.73m2    GFR est non-AA >60 >60 ml/min/1.73m2    Calcium 9.6 8.5 - 10.1 MG/DL      Xr Chest Pa Lat    Result Date: 11/30/2019  EXAM: XR CHEST PA LAT CLINICAL INDICATION/HISTORY: SOB, rib pain   > Additional: None. COMPARISON: None. TECHNIQUE:  Frontal and lateral views _______________ FINDINGS: SUPPORT DEVICES: None. HEART AND MEDIASTINUM: There are streaky linear opacities along the base of the neck as well as the mediastinum which are better appreciated on the lateral view. A small amount of gas in the supra clavicular fossa is also noted. Heart size is normal. LUNGS AND PLEURAL SPACES: The lungs are well expanded and clear. No focal consolidation, effusion, or pneumothorax. BONY THORAX AND SOFT TISSUES: No acute osseous abnormality.  _______________     IMPRESSION: Pneumomediastinum. Nonspecific etiology. No evidence of pneumothorax or other acute findings. CRITICAL RESULT:  These critical results were directly communicated to [place] [id number] on 11/30/2019 12:30 PM.  Results understood and acknowledged. Ct Chest W Cont    Result Date: 11/30/2019  EXAM: CT CHEST W CONT CLINICAL INDICATION/HISTORY: Chest wall pain   > Additional: None. COMPARISON: Same day chest x-ray TECHNIQUE: Axial CT imaging from the thoracic inlet through the diaphragm with intravenous contrast. Multiplanar reformats were generated. One or more dose reduction techniques were used on this CT: automated exposure control, adjustment of the mAs and/or kVp according to patient size, and iterative reconstruction techniques. The specific techniques used on this CT exam have been documented in the patient's electronic medical record. Digital Imaging and Communications in Medicine (DICOM) format image data are available to nonaffiliated external healthcare facilities or entities on a secure, media free, reciprocally searchable basis with patient authorization for at least a 12-month period after this study. _______________ FINDINGS: LUNGS AND PLEURA: The lungs are well expanded and clear. Very tiny left apical pneumothorax. AIRWAYS: Central airways are patent. MEDIASTINUM: Diffuse pneumomediastinum is noted throughout the chest with gas tracking to the base of the neck and supraclavicular regions corresponding to the findings on prior chest x-ray. CHEST WALL: Unremarkable. UPPER ABDOMEN: Unremarkable. _______________     IMPRESSION: 1. Diffuse pneumomediastinum without clear etiology corresponding to the known radiographic findings. 2. Tiny left apical pneumothorax. Recommend attention on ongoing follow-up imaging.      ED COURSE:      IMPRESSION AND MEDICAL DECISION MAKING:  Based upon the patient's presentation with noted HPI and PE, along with the work up done in the emergency department, I believe that the patient is having pneumomediastinum and a small apical pneumo. 2:15PM consult with Dr. Fara Bautista, hospitalist, who states he will accept the patient for admission. He is requesting the patient to be put on [address] 10 by respiratory. 2:35PM Respiratory here and has placed patient on high flow O2. Patient and mom were informed of admission. He is resting comfortably in the room, vitals WNL. Diagnosis:   1. Pneumomediastinum ([address]    2. Pneumothorax on left      Disposition: Admission    Patient's Medications   Start Taking    No medications on file   Continue Taking    No medications on file   These Medications have changed    No medications on file   Stop Taking    ONDANSETRON HCL (ZOFRAN) 4 MG TABLET    Take 2 Tabs by mouth every eight (8) hours as needed for Nausea.      Ayush Suh, PA

## 2019-11-30 NOTE — H&P
Internal Medicine History and Physical          Subjective     HPI: Bruce Baugh is a 20 y.o. male with no significant PMHx who presented to the ED with acute onset of chest pain and pleurisy. He was at baseline health until this all started yesterday. His initial symptoms were some loss of voice which was followed by chest pain and pain with deep breaths. This continued through night into this morning so he came to ED for evaluation. He denies any tobacco or vape products. He denies alcohol or drugs. He denies any recent trauma. He admits to coughing but nothing violent in nature. No recent straining. His mother admits being mentioned that his kidney function was abnormal but nothing worked up from that. He has never been hospitalized before. In the ED, imaging was consistent with pneumomediastinum and tiny apical PTX. He will be brought in under observation. PMHx:  History reviewed. No pertinent past medical history. PSurgHx:  History reviewed. No pertinent surgical history.     SocialHx:  Social History     Socioeconomic History    Marital status: SINGLE     Spouse name: Not on file    Number of children: Not on file    Years of education: Not on file    Highest education level: Not on file   Occupational History    Not on file   Social Needs    Financial resource strain: Not on file    Food insecurity:     Worry: Not on file     Inability: Not on file    Transportation needs:     Medical: Not on file     Non-medical: Not on file   Tobacco Use    Smoking status: Never Smoker    Smokeless tobacco: Never Used   Substance and Sexual Activity    Alcohol use: Never     Frequency: Never    Drug use: Never    Sexual activity: Not on file   Lifestyle    Physical activity:     Days per week: Not on file     Minutes per session: Not on file    Stress: Not on file   Relationships    Social connections:     Talks on phone: Not on file     Gets together: Not on file     Attends Alevism service: Not on file     Active member of club or organization: Not on file     Attends meetings of clubs or organizations: Not on file     Relationship status: Not on file    Intimate partner violence:     Fear of current or ex partner: Not on file     Emotionally abused: Not on file     Physically abused: Not on file     Forced sexual activity: Not on file   Other Topics Concern    Not on file   Social History Narrative    Not on file       Allergies:  No Known Allergies    FamilyHx:  History reviewed. No pertinent family history.     None       Review of Systems:  CONST: Fever, weight loss, fatigue or chills  HEENT: Recent changes in vision, vertigo, epistaxis, dysphagia and hoarseness  CV: Chest pain, palpitations, edema and varicosities  RESP: Cough, shortness of breath, wheezing, hemoptysis, snoring and reactive airway disease  GI: Nausea, vomiting, abdominal pain, change in bowel habits, hematochezia, melena, and GERD   : Hematuria, dysuria, frequency, urgency, nocturia and stress urinary incontinence   MS: Weakness, joint pain and arthritis  ENDO: Polyuria, polydipsia, polyphagia, poor wound healing, heat intolerance, cold intolerance  LYMPH/HEME: Anemia, bruising and history of blood transfusions  INTEG: Dermatitis, abnormal moles  NEURO: Dizziness, headache, fainting, seizures and stroke  PSYCH: Anxiety and depression      Objective      Visit Vitals  /75   Pulse 94   Temp 98.3 °F (36.8 °C)   Resp 16   Ht 5' 9\" (1.753 m)   Wt 63 kg (139 lb)   SpO2 100%   BMI 20.53 kg/m²       Physical Exam:  General Appearance: NAD, conversant  HENT: normocephalic/atraumatic, moist mucus membranes  Lungs: CTA with normal respiratory effort  Cardiovascular: RRR, no m/r/g, capillary refill < 2 sec, B/L DP/PT pulses +3/4  Abdomen: soft, non-tender, normal bowel sounds  Extremities: no cyanosis, no peripheral edema  Neuro: moves all extremities, no focal deficits  Psych: appropriate affect, alert and oriented to person, place and time    Laboratory Studies:  BMP:   Lab Results   Component Value Date/Time     11/30/2019 12:15 PM    K 4.0 11/30/2019 12:15 PM     11/30/2019 12:15 PM    CO2 29 11/30/2019 12:15 PM    AGAP 5 11/30/2019 12:15 PM    GLU 85 11/30/2019 12:15 PM    BUN 15 11/30/2019 12:15 PM    CREA 1.34 (H) 11/30/2019 12:15 PM    GFRAA >60 11/30/2019 12:15 PM    GFRNA >60 11/30/2019 12:15 PM     CBC:   Lab Results   Component Value Date/Time    WBC 7.7 11/30/2019 12:15 PM    HGB 14.8 11/30/2019 12:15 PM    HCT 43.0 11/30/2019 12:15 PM     11/30/2019 12:15 PM       Imaging Reviewed:  Xr Chest Pa Lat    Result Date: 11/30/2019  EXAM: XR CHEST PA LAT CLINICAL INDICATION/HISTORY: SOB, rib pain   > Additional: None. COMPARISON: None. TECHNIQUE:  Frontal and lateral views _______________ FINDINGS: SUPPORT DEVICES: None. HEART AND MEDIASTINUM: There are streaky linear opacities along the base of the neck as well as the mediastinum which are better appreciated on the lateral view. A small amount of gas in the supra clavicular fossa is also noted. Heart size is normal. LUNGS AND PLEURAL SPACES: The lungs are well expanded and clear. No focal consolidation, effusion, or pneumothorax. BONY THORAX AND SOFT TISSUES: No acute osseous abnormality. _______________     IMPRESSION: Pneumomediastinum. Nonspecific etiology. No evidence of pneumothorax or other acute findings. CRITICAL RESULT:  These critical results were [facility] on 11/30/2019 12:30 PM.  Results understood and acknowledged. Ct Chest W Cont    Result Date: 11/30/2019  EXAM: CT CHEST W CONT CLINICAL INDICATION/HISTORY: Chest wall pain   > Additional: None. COMPARISON: Same day chest x-ray TECHNIQUE: Axial CT imaging from the thoracic inlet through the diaphragm with intravenous contrast. Multiplanar reformats were generated.  One or more dose reduction techniques were used on this CT: automated exposure control, adjustment of the mAs and/or kVp according to patient size, and iterative reconstruction techniques. The specific techniques used on this CT exam have been documented in the patient's electronic medical record. Digital Imaging and Communications in Medicine (DICOM) format image data are available to nonaffiliated external healthcare facilities or entities on a secure, media free, reciprocally searchable basis with patient authorization for at least a 12-month period after this study. _______________ FINDINGS: LUNGS AND PLEURA: The lungs are well expanded and clear. Very tiny left apical pneumothorax. AIRWAYS: Central airways are patent. MEDIASTINUM: Diffuse pneumomediastinum is noted throughout the chest with gas tracking to the base of the neck and supraclavicular regions corresponding to the findings on prior chest x-ray. CHEST WALL: Unremarkable. UPPER ABDOMEN: Unremarkable. _______________     IMPRESSION: 1. Diffuse pneumomediastinum without clear etiology corresponding to the known radiographic findings. 2. Tiny left apical pneumothorax. Recommend attention on ongoing follow-up imaging.       EKG:   Normal sinus rhythm with sinus arrhythmia   T wave abnormality, consider inferior ischemia   T wave abnormality, consider anterolateral ischemia   Abnormal ECG   When compared with ECG of 30-NOV-2019 11:57,   No significant change was found         Assessment/Plan     Active Hospital Problems    Diagnosis Date Noted    Pneumomediastinum ([address] 11/30/2019    Pneumothorax 11/30/2019    Chronic renal failure, stage 2 (mild) 11/30/2019     - At this time, nothing in history to assist with etiology other than his cough (although this is uncommon cause)  - He is only 5'9\" without long fingers thus Marfan's syndrome low suspicion  - No pes excavatum   - Vitals stable w/o hypoxia  - D/w pulm  - Will treat on Salter 10L   - Trend CXR, if worsening will consult pulm  - Pain meds PRN  - Etiology of CKD unknown, will need outpt follow up  - Cont acceptable home medications for chronic conditions   - DVT protocol    I have personally reviewed all pertinent labs, films and EKGs that have officially resulted. I reviewed available electronic documentation outlining the initial presentation as well as the emergency room physician's encounter.     Caasndra Mcclure DO  Internal Medicine, Hospitalist  Pager: [phone]

## 2019-12-01 ENCOUNTER — APPOINTMENT (OUTPATIENT)
Dept: GENERAL RADIOLOGY | Age: 19
End: 2019-12-01
Attending: HOSPITALIST
Payer: MEDICAID

## 2019-12-01 LAB
ATRIAL RATE: 71 BPM
ATRIAL RATE: 80 BPM
CALCULATED P AXIS, ECG09: 63 DEGREES
CALCULATED P AXIS, ECG09: 68 DEGREES
CALCULATED R AXIS, ECG10: 79 DEGREES
CALCULATED R AXIS, ECG10: 82 DEGREES
CALCULATED T AXIS, ECG11: -85 DEGREES
CALCULATED T AXIS, ECG11: -85 DEGREES
DIAGNOSIS, 93000: NORMAL
DIAGNOSIS, 93000: NORMAL
P-R INTERVAL, ECG05: 142 MS
P-R INTERVAL, ECG05: 162 MS
Q-T INTERVAL, ECG07: 336 MS
Q-T INTERVAL, ECG07: 336 MS
QRS DURATION, ECG06: 100 MS
QRS DURATION, ECG06: 98 MS
QTC CALCULATION (BEZET), ECG08: 365 MS
QTC CALCULATION (BEZET), ECG08: 370 MS
VENTRICULAR RATE, ECG03: 71 BPM
VENTRICULAR RATE, ECG03: 73 BPM

## 2019-12-01 PROCEDURE — 71045 X-RAY EXAM CHEST 1 VIEW: CPT

## 2019-12-01 PROCEDURE — 77010033711 HC HIGH FLOW OXYGEN

## 2019-12-01 PROCEDURE — 82103 ALPHA-1-ANTITRYPSIN TOTAL: CPT

## 2019-12-01 PROCEDURE — 99218 HC RM OBSERVATION: CPT

## 2019-12-01 NOTE — PROGRESS NOTES
Internal Medicine Progress Note    Patient's Name: Valarie Chappell  Admit Date: 11/30/2019  Length of Stay: 0      Assessment/Plan     Active Hospital Problems    Diagnosis Date Noted    Pneumomediastinum ([address] 11/30/2019    Pneumothorax 11/30/2019    Chronic renal failure, stage 2 (mild) 11/30/2019     - Cont salter 10L  - CXR official read pending, appears same as yest  - Daily CXR  - Pulm consulted for assistance  - Needs outpatient follow up on CKD    I have personally reviewed all pertinent labs and films that have officially resulted over the last 24 hours. I have personally checked for all pending labs that are awaiting final results. Subjective     Pt s/e @ bedside  No major events overnight  Doing better, initial complaints resolved  Denies CP or SOB    Objective     Visit Vitals  /71   Pulse 74   Temp 97.9 °F (36.6 °C)   Resp 20   Ht 5' 9\" (1.753 m)   Wt 63 kg (139 lb)   SpO2 100%   BMI 20.53 kg/m²       Physical Exam:  General Appearance: NAD, conversant  Lungs: CTA with normal respiratory effort  CV: RRR, no m/r/g  Abdomen: soft, non-tender, normal bowel sounds  Extremities: no cyanosis, no peripheral edema  Neuro: No focal deficits, motor/sensory intact    Lab/Data Reviewed:  BMP: No results found for: NA, K, CL, CO2, AGAP, GLU, BUN, CREA, GFRAA, GFRNA  CBC: No results found for: WBC, HGB, HGBEXT, HCT, HCTEXT, PLT, PLTEXT, HGBEXT, HCTEXT, PLTEXT    Imaging Reviewed:  Ct Chest W Cont    Result Date: 11/30/2019  EXAM: CT CHEST W CONT CLINICAL INDICATION/HISTORY: Chest wall pain   > Additional: None. COMPARISON: Same day chest x-ray TECHNIQUE: Axial CT imaging from the thoracic inlet through the diaphragm with intravenous contrast. Multiplanar reformats were generated. One or more dose reduction techniques were used on this CT: automated exposure control, adjustment of the mAs and/or kVp according to patient size, and iterative reconstruction techniques.   The specific techniques used on this CT exam have been documented in the patient's electronic medical record. Digital Imaging and Communications in Medicine (DICOM) format image data are available to nonaffiliated external healthcare facilities or entities on a secure, media free, reciprocally searchable basis with patient authorization for at least a 12-month period after this study. _______________ FINDINGS: LUNGS AND PLEURA: The lungs are well expanded and clear. Very tiny left apical pneumothorax. AIRWAYS: Central airways are patent. MEDIASTINUM: Diffuse pneumomediastinum is noted throughout the chest with gas tracking to the base of the neck and supraclavicular regions corresponding to the findings on prior chest x-ray. CHEST WALL: Unremarkable. UPPER ABDOMEN: Unremarkable. _______________     IMPRESSION: 1. Diffuse pneumomediastinum without clear etiology corresponding to the known radiographic findings. 2. Tiny left apical pneumothorax. Recommend attention on ongoing follow-up imaging.       Medications Reviewed:  Current Facility-Administered Medications   Medication Dose Route Frequency    influenza vaccine 2019-20 (6 mos+)(PF) (FLUARIX/FLULAVAL/FLUZONE QUAD) injection 0.5 mL  0.5 mL IntraMUSCular PRIOR TO DISCHARGE    acetaminophen (TYLENOL) tablet 650 mg  650 mg Oral Q4H PRN    oxyCODONE-acetaminophen (PERCOCET) 5-325 mg per tablet 1 Tab  1 Tab Oral Q4H PRN           Gunner Cuevas DO  Internal Medicine, Hospitalist  Pager: [facility]

## 2019-12-01 NOTE — ED NOTES
Assumed care of pt at this time, pt sitting in stretcher in NAD on full cardiac monitor. Updated pt on care plan, agreeable at this time. Patient awating inpatient placement. Denies any chest pain or SOB. Will continue to monitor.

## 2019-12-01 NOTE — PROGRESS NOTES
2045  11/30/19 TRANSFER - IN REPORT:    Pt being received from ED (unit) for routine progression of care      Report consisted of patients Situation, Background, Assessment and   Recommendations(SBAR). Information from the following report(s) SBAR, Procedure Summary, MAR and Recent Results was reviewed with the receiving nurse. Opportunity for questions and clarification was provided. Assessment completed upon patients arrival to unit and care assumed. Required documentation completed    Patient alert and oriented x4. Denies pain/SOB. Family at bedside. Reviewed safety plan and plan of care. Will continue to monitor. 0000 Bedside, Verbal and Written shift change report given to sister Bia Lopez RN (oncoming nurse) by Ernestine Moses RN (offgoing nurse). Report included the following information SBAR, Kardex, Intake/Output, MAR and Recent Results. Skin assessment completed.

## 2019-12-01 NOTE — PROGRESS NOTES
65- Assumed care of pt from [facility]. Pt in bed resting no concerns voiced. Pt on high flow oxygen tolerating well. HOB elevated, bed low and in locked position. Call light within reach. Family at bedside. 0901- Pt sleeping. 0815-Bedside and Verbal shift change report given to  FITO Wells (oncoming nurse) by Renetta Salvador RN (offgoing nurse). Report included the following information SBAR, Kardex, Intake/Output, MAR and Recent Results.

## 2019-12-01 NOTE — PROGRESS NOTES
Problem: Pain  Goal: *Control of Pain  Outcome: Progressing Towards Goal     Problem: Breathing Pattern - Ineffective  Goal: *Absence of hypoxia  Outcome: Progressing Towards Goal  Goal: *Use of effective breathing techniques  Outcome: Progressing Towards Goal     Problem: Falls - Risk of  Goal: *Absence of Falls  Description  Document Dixie Estrella Fall Risk and appropriate interventions in the flowsheet.   Outcome: Progressing Towards Goal  Note: Fall Risk Interventions:            Medication Interventions: Teach patient to arise slowly, Patient to call before getting OOB

## 2019-12-01 NOTE — CONSULTS
[facility]  Pulmonary, Critical Care, and Sleep Medicine    Name: Chary Miller MRN: [id number]   : 2000 Hospital: [facility]   Date: 2019  Consulted By: Dr Amanda Ferreira History and Physical      Subjective/History:   Patient is a 20 y.o. male with no PMHx, admitted with spontaneous pneumothorax and pneumomediastinum following a forceful coughing episode. He was started on Hiflo O2 overnight and now reports no further CP or pleurisy. Denies SOB. He is eager to go home. He denies hx asthma, recent N/V, smoking, vaping, or drug use. He has never has a pneumothorax before. History reviewed. No pertinent past medical history. Prior to Admission medications    Not on File     Current Facility-Administered Medications   Medication Dose Route Frequency    influenza vaccine  (6 mos+)(PF) (FLUARIX/FLULAVAL/FLUZONE QUAD) injection 0.5 mL  0.5 mL IntraMUSCular PRIOR TO DISCHARGE     No Known Allergies     Social History     Tobacco Use    Smoking status: Never Smoker    Smokeless tobacco: Never Used   Substance Use Topics    Alcohol use: Never     Frequency: Never        History reviewed. No pertinent family history. Review of Systems:  A comprehensive review of systems was negative except for that written in the HPI. Objective:   Vital Signs:    Visit Vitals  /71   Pulse 74   Temp 97.9 °F (36.6 °C)   Resp 20   Ht 5' 9\" (1.753 m)   Wt 63 kg (139 lb)   SpO2 100%   BMI 20.53 kg/m²       O2 Device: Hi flow nasal cannula   O2 Flow Rate (L/min): 10 l/min   Temp (24hrs), Av.3 °F (36.8 °C), Min:97.9 °F (36.6 °C), Max:98.7 °F (37.1 °C)       Intake/Output:   Last shift:      No intake/output data recorded.   Last 3 shifts:  1901 -  0700  In: 240 [P.O.:240]  Out: -     Intake/Output Summary (Last 24 hours) at 2019 1405  Last data filed at 2019 0030  Gross per 24 hour   Intake 240 ml   Output    Net 240 ml Physical Exam:   General:  WDWN Young adult AAM, Alert, cooperative, NAD   HEENT:   NCAT, PERRL, OP clear, MM moist    Neck: Supple, trachea midline. Lungs:   Symmetrical chest rise; good AE bilat; CTAB; no wheezes/rhonchi/rales noted. Musculo: No pectus deformity on chest, no arachnodactyly   Heart:  RRR, S1, S2 normal, no m/r/g   Abdomen:   Soft, non-tender. Bowel sounds normal.    Extremities: Extremities normal, atraumatic, no cyanosis or edema. Pulses: 2+ and symmetric all extremities. Skin: Skin color, texture, turgor normal. No rashes or lesions. No subcut crepitus palpated on chest or neck. Neurologic: Grossly nonfocal, AAOx3   Devices:      Data:   No results found for this or any previous visit (from the past 24 hour(s)). No results for input(s): FIO2I, IFO2, HCO3I, IHCO3, HCOPOC, PCO2I, PCOPOC, IPHI, PHI, PHPOC, PO2I, PO2POC in the last 72 hours. No lab exists for component: IPOC2    Imaging:  I have personally reviewed the patients radiographs and have reviewed the reports:    CXR [12/1]: Previously noted soft tissue emphysema in the lower neck appear to be  decreasing. The left apical pneumothorax is no longer seen.      CT Chest [12/1]: 1. Diffuse pneumomediastinum without clear etiology corresponding to the knownradiographic findings. 2. Tiny left apical pneumothorax. Recommend attention on ongoing follow-up imaging. IMPRESSION:   19yoM with spontaneous pneumothorax and pneumomediastinum      PLAN:   - increase Hiflo O2 from 10L to 15L to help pneumothorax reabsorb more quickly  - will check A1AT  - no signs of marfans, unlikely has CF since is  [place], no hx asthma or forceful vomiting; and denies smoking or drug use. Is is very unusual that without a co-morbidity that he would get a spontaneous pneumothorax from coughing alone, but it does happen  - repeat CXR in AM           Total of  60  min spent on this new inpatient pulmonary consult.      Oletta Rubinstein MD [facility]  Pulmonary & Critical Care

## 2019-12-01 NOTE — PROGRESS NOTES
Problem: Pain  Goal: *Control of Pain  Outcome: Progressing Towards Goal     Problem: Breathing Pattern - Ineffective  Goal: *Absence of hypoxia  Outcome: Progressing Towards Goal  Goal: *Use of effective breathing techniques  Outcome: Progressing Towards Goal     Problem: Falls - Risk of  Goal: *Absence of Falls  Description  Document Johnny Odonnell Fall Risk and appropriate interventions in the flowsheet.   Outcome: Progressing Towards Goal  Note: Fall Risk Interventions:            Medication Interventions: Teach patient to arise slowly

## 2019-12-01 NOTE — PROGRESS NOTES
Problem: Discharge Planning  Goal: *Discharge to safe environment  Outcome: Resolved/Met   Home and oupt follow up

## 2019-12-01 NOTE — PROGRESS NOTES
Discharge/Transition Planning    Problem: Discharge Planning  Goal: *Discharge to safe environment  Outcome: Resolved/Met   Home and oupt follow up        Patient and/or next of kin has been given the Outpatient Observation Information and Notification letter and all questions answered. Reason for Admission:   Pneumonia                   RRAT Score:      4               Plan for utilizing home health:    n/a                      Current Advanced Directive/Advance Care Plan: none                         Transition of Care Plan:                      Interviewed patient. Verified demographics listed on face sheet with patient; all information correct. Has [employer]. Patient stated their PCP is KAREN Edwards and saw 1-2 weeks ago. Patient lives in apartment with mom . Patient's NOK is mom. Patient independent with ADLs prior to admission. No use of DME prior to admission. Discharge plan is Home      Patient has designated _____mom___________________ to participate in his/her discharge plan and to receive any needed information. Chelita Torres Mother [phone]     Care Management Interventions  PCP Verified by CM: Yes(KAREN Edwards)  Last Visit to PCP: 11/18/19  Mode of Transport at Discharge:  Other (see comment)(mom)  Transition of Care Consult (CM Consult): Discharge Planning  Current Support Network: Relative's Home  Confirm Follow Up Transport: Self  Plan discussed with Pt/Family/Caregiver: Yes  Discharge Location  Discharge Placement: Home

## 2019-12-02 ENCOUNTER — APPOINTMENT (OUTPATIENT)
Dept: GENERAL RADIOLOGY | Age: 19
End: 2019-12-02
Attending: HOSPITALIST
Payer: MEDICAID

## 2019-12-02 VITALS
OXYGEN SATURATION: 99 % | BODY MASS INDEX: 20.59 KG/M2 | DIASTOLIC BLOOD PRESSURE: 81 MMHG | WEIGHT: 139 LBS | HEART RATE: 78 BPM | SYSTOLIC BLOOD PRESSURE: 133 MMHG | HEIGHT: 69 IN | RESPIRATION RATE: 18 BRPM | TEMPERATURE: 97.6 F

## 2019-12-02 LAB
ANION GAP SERPL CALC-SCNC: 5 MMOL/L (ref 3–18)
BUN SERPL-MCNC: 14 MG/DL (ref 7–18)
BUN/CREAT SERPL: 11 (ref 12–20)
CALCIUM SERPL-MCNC: 9.1 MG/DL (ref 8.5–10.1)
CHLORIDE SERPL-SCNC: 103 MMOL/L (ref 100–111)
CO2 SERPL-SCNC: 31 MMOL/L (ref 21–32)
CREAT SERPL-MCNC: 1.3 MG/DL (ref 0.6–1.3)
GLUCOSE SERPL-MCNC: 58 MG/DL (ref 74–99)
POTASSIUM SERPL-SCNC: 4 MMOL/L (ref 3.5–5.5)
SODIUM SERPL-SCNC: 139 MMOL/L (ref 136–145)

## 2019-12-02 PROCEDURE — 77010033711 HC HIGH FLOW OXYGEN

## 2019-12-02 PROCEDURE — 99218 HC RM OBSERVATION: CPT

## 2019-12-02 PROCEDURE — 71045 X-RAY EXAM CHEST 1 VIEW: CPT

## 2019-12-02 PROCEDURE — 36415 COLL VENOUS BLD VENIPUNCTURE: CPT

## 2019-12-02 PROCEDURE — 80048 BASIC METABOLIC PNL TOTAL CA: CPT

## 2019-12-02 RX ORDER — ACETAMINOPHEN 325 MG/1
650 TABLET ORAL
Qty: 30 TAB | Refills: 0 | Status: SHIPPED
Start: 2019-12-02

## 2019-12-02 NOTE — PROGRESS NOTES
conducted an initial consultation and Spiritual Assessment for Ryan Weaver, who is a 19 y.o.,male. Patients Primary Language is: [place]. According to the patients EMR Lutheran Affiliation is: Non Adventist.     The reason the Patient came to the hospital is:   Patient Active Problem List    Diagnosis Date Noted    Pneumomediastinum ([facility] [address] 11/30/2019    Pneumothorax 11/30/2019    Chronic renal failure, stage 2 (mild) 11/30/2019        The  provided the following Interventions:  Initiated a relationship of care and support. Explored issues of marshal, spirituality and/or Zoroastrian needs while hospitalized. Listened empathically. Provided chaplaincy education. Provided information about Spiritual Care Services. Offered prayer and assurance of continued prayers on patient's behalf. Chart reviewed. The following outcomes were achieved:  Patient shared some information about their medical narrative and spiritual journey/beliefs. Patient processed feeling about current hospitalization. Patient expressed gratitude for the 's visit. Assessment:  Patient did not indicate any spiritual or Zoroastrian issues which require Spiritual Care Services interventions at this time. Patient does not have any Zoroastrian/cultural needs that will affect patients preferences in health care. Plan:  Chaplains will continue to follow and will provide pastoral care on an as needed or requested basis.  recommends bedside caregivers page  on duty if patient shows signs of acute spiritual or emotional distress.     [address] [facility]   Staff [address] [facility]   [phone]

## 2019-12-02 NOTE — PROGRESS NOTES
Discharge instructions provided to pt on behalf of primary nurse [facility]. Follow up appointments reviewed. Pt to take acetaminophen over the counter PRN. Pt escorted to lobby to drive self.

## 2019-12-02 NOTE — CONSULTS
[facility]  Name: Ender Londono   : 2000   MRN: [id number]   Date: 2019    []I have reviewed the flowsheet and previous days notes. []The patient is unable to give any meaningful history or review of systems because the patient is:  []Intubated []Sedated   []Unresponsive      []The patient is critically ill on      []Mechanical ventilation []Pressors   []BiPAP []   S: No respiratory complaints              ROS:A comprehensive review of systems was negative except for that written in the HPI. Events and notes from last 24 hours reviewed. Care plan discussed on multidisciplinary rounds. Vital Signs:    Visit Vitals  /81   Pulse 78   Temp 97.6 °F (36.4 °C)   Resp 18   Ht 5' 9\" (1.753 m)   Wt 63 kg (139 lb)   SpO2 99%   BMI 20.53 kg/m²       O2 Device: Hi flow nasal cannula   O2 Flow Rate (L/min): 15 l/min   Temp (24hrs), Av.1 °F (36.7 °C), Min:97.6 °F (36.4 °C), Max:98.5 °F (36.9 °C)       Intake/Output:   Last shift:       0701 -  1900  In: 360 [P.O.:360]  Out: -   Last 3 shifts:  190 -  0700  In: 960 [P.O.:960]  Out: 300 [Urine:300]    Intake/Output Summary (Last 24 hours) at 2019 1201  Last data filed at 2019 0806  Gross per 24 hour   Intake 840 ml   Output 300 ml   Net 540 ml     Hemodynamics:       :      Ventilator Settings:                Physical Exam:    General: in no apparent distress, well developed and well nourished, non-toxic, in no respiratory distress and acyanotic, alert, oriented times 3, afebrile and normal vitals   HEENT: Normal   Neck: No abnormally enlarged lymph nodes.    Chest: normal   Lungs: normal air entry  no dullness/ tenderness/ rash   Heart: Regular rate and rhythm or S1S2 present   Abdomen: non distended, bowel sounds normoactive, tympanic, abdomen is soft without significant tenderness, masses, organomegaly or guarding   Extremity: negative   Neuro: alert   Skin: Skin color, texture, turgor normal. No rashes or lesions      DATA:   Current Facility-Administered Medications   Medication Dose Route Frequency    influenza vaccine 2019-20 (6 mos+)(PF) (FLUARIX/FLULAVAL/FLUZONE QUAD) injection 0.5 mL  0.5 mL IntraMUSCular PRIOR TO DISCHARGE    acetaminophen (TYLENOL) tablet 650 mg  650 mg Oral Q4H PRN    oxyCODONE-acetaminophen (PERCOCET) 5-325 mg per tablet 1 Tab  1 Tab Oral Q4H PRN       Telemetry: [x]Sinus []A-flutter []Paced    []A-fib []Multiple PVCs                  Labs:  Recent Labs     11/30/19  1215   WBC 7.7   HGB 14.8   HCT 43.0        Recent Labs     12/02/19  0442 11/30/19  1215    138   K 4.0 4.0    104   CO2 31 29   GLU 58* 85   BUN 14 15   CREA 1.30 1.34*   CA 9.1 9.6     No results for input(s): PH, PCO2, PO2, HCO3, FIO2 in the last 72 hours.     Imaging:  []I have personally reviewed the patients radiographs  []Radiographs reviewed with radiologist   [] No CXR study available for review today   []No change from prior, tubes and lines in adequate position  [x]Improved   []Worsening: PTX completely resolved     IMPRESSION:   Patient Active Problem List   Diagnosis Code    Pneumomediastinum ([facility] [address] J98.2    Pneumothorax J93.9    Chronic renal failure, stage 2 (mild) N18.2     19yoM with spontaneous pneumothorax and pneumomediastinum       PLAN:      PLAN:   · OK to DC from Pulmonary point   · Would like to see him as O/P for Pulmonary f/u of Spontaneous Ptx  · Appointment made 12/12/19 at 8 AM at my office     The patient is: [x] acutely ill Risk of deterioration: [x] moderate    [] critically ill  [] high     []See my orders for details    My assessment, plan of care, findings, medications, side effects etc were discussed with:  [x]nursing []PT/OT    [x]respiratory therapy [x]   []family []     [x]Total critical care time exclusive of procedures 25 minutes  Hany Rios MD

## 2019-12-02 NOTE — PROGRESS NOTES
To Whom it May Concern,    Mr Paz Bar was Hospitalized for medical care at [facility] from 12/1/19-12/2/19. He can return to work on 12/3/19 without restrictions. Please call if you have any questions. [phone].     Sincerely,        Radha Palm MD

## 2019-12-02 NOTE — PROGRESS NOTES
Discharge/Transition Planning     Problem: Discharge Planning  Goal: *Discharge to safe environment  Outcome: Resolved/Met   Home and oupt follow up        Care Management Interventions  PCP Verified by CM: Yes(KAREN Kline)  Last Visit to PCP: 11/18/19  Mode of Transport at Discharge: Other (see comment)(mom)  Transition of Care Consult (CM Consult): Other(home)  Current Support Network: Relative's Home  Confirm Follow Up Transport: Family  Plan discussed with Pt/Family/Caregiver: Yes  Discharge Location  Discharge Placement: Home     Patient on RA at this time     Patient stated that his family will come to get him home.

## 2019-12-02 NOTE — PHYSICIAN ADVISORY
Letter of Admission Status Determination: Upgrade to Inpatient     Antonio Ruby was originally hospitalized as Outpatient Observation status on 11/30/2019. Based on the documented clinical condition and care plan, we recommend upgrading patient's hospitalization status to INPATIENT.      The final decision regarding the patient's hospitalization status depends on the attending physician's judgment.       Dre Samuel MD, KENYA, [address] [facility], [address] [place] [address]  [phone]

## 2019-12-02 NOTE — PROGRESS NOTES
Problem: Pain  Goal: *Control of Pain  Outcome: Progressing Towards Goal     Problem: Breathing Pattern - Ineffective  Goal: *Absence of hypoxia  Outcome: Progressing Towards Goal  Goal: *Use of effective breathing techniques  Outcome: Progressing Towards Goal     Problem: Falls - Risk of  Goal: *Absence of Falls  Description  Document Jaz Echeverria Fall Risk and appropriate interventions in the flowsheet.   Outcome: Progressing Towards Goal  Note: Fall Risk Interventions:  Mobility Interventions: Patient to call before getting OOB         Medication Interventions: Patient to call before getting OOB, Teach patient to arise slowly    Elimination Interventions: Patient to call for help with toileting needs, Call light in reach, Urinal in reach

## 2019-12-02 NOTE — DISCHARGE INSTRUCTIONS
DISCHARGE SUMMARY from Nurse    PATIENT INSTRUCTIONS:    After general anesthesia or intravenous sedation, for 24 hours or while taking prescription Narcotics:  · Limit your activities  · Do not drive and operate hazardous machinery  · Do not make important personal or business decisions  · Do  not drink alcoholic beverages  · If you have not urinated within 8 hours after discharge, please contact your surgeon on call. Report the following to your surgeon:  · Excessive pain, swelling, redness or odor of or around the surgical area  · Temperature over 100.5  · Nausea and vomiting lasting longer than 4 hours or if unable to take medications  · Any signs of decreased circulation or nerve impairment to extremity: change in color, persistent  numbness, tingling, coldness or increase pain  · Any questions    What to do at Home:  Recommended activity: Activity as tolerated    If you experience any of the following symptoms worsening shortness of breath, palpitations, or chest discomfort, please follow up with primary care physician/emergency room. *  Please give a list of your current medications to your Primary Care Provider. *  Please update this list whenever your medications are discontinued, doses are      changed, or new medications (including over-the-counter products) are added. *  Please carry medication information at all times in case of emergency situations. These are general instructions for a healthy lifestyle:    No smoking/ No tobacco products/ Avoid exposure to second hand smoke  Surgeon General's Warning:  Quitting smoking now greatly reduces serious risk to your health.     Obesity, smoking, and sedentary lifestyle greatly increases your risk for illness    A healthy diet, regular physical exercise & weight monitoring are important for maintaining a healthy lifestyle    You may be retaining fluid if you have a history of heart failure or if you experience any of the following symptoms: Weight gain of 3 pounds or more overnight or 5 pounds in a week, increased swelling in our hands or feet or shortness of breath while lying flat in bed. Please call your doctor as soon as you notice any of these symptoms; do not wait until your next office visit. The discharge information has been reviewed with the patient. The patient verbalized understanding. Discharge medications reviewed with the patient and appropriate educational materials and side effects teaching were provided. Patient armband removed and shredded.

## 2019-12-02 NOTE — PROGRESS NOTES
Bobby Grissom pt care from FITO Jain. Pt in bed resting alert and oriented x4 denies pain at the moment. Pt on high flow oxygen 15 mL tolorating well Assessement completed plan of care for the shift explained pt verbalized understanding. HOB elevated, bed low and in locked position. Call light and frequently used items within reach. .  8432- Pt resting watching tv no concerns voiced. 0120- Pt still awake watching tv. 0240- Pt sleeping. 0710- Bedside and Verbal shift change report given to  Americo Alfredo RN (oncoming nurse) by Tamir Santizo RN (offgoing nurse). Report included the following information SBAR, Kardex, Intake/Output, MAR and Recent Results.

## 2019-12-04 LAB
A1AT PHENOTYP SERPL IFE: NORMAL
A1AT SERPL-MCNC: 102 MG/DL (ref 95–164)

## 2020-03-14 NOTE — DISCHARGE SUMMARY
[address]    Name:  Gavi Li  MR#:   [id number]  :  2000  ACCOUNT #:  [de-identified]  ADMIT DATE:  2019  DISCHARGE DATE:  2019    ADMITTING DIAGNOSIS:  Pneumomediastinum. HISTORY OF PRESENT ILLNESS:  The patient is a 71-year-old male who presented to the emergency department with complaints of chest pain. This was associated with shortness of breath. In the emergency department, he was found to have pneumomediastinum. This was spontaneous in nature. He was admitted for ongoing management. HOSPITAL COURSE:  The patient was followed closely. Pulmonology consultation was obtained. He was started on oxygen overnight. He had no recurrence of chest pain. He was able to wean down off his oxygen effectively, and by , he was cleared for discharge by Pulmonology. He will follow up in 10 days in the pulmonologist's office. DISCHARGE DIAGNOSIS:  Spontaneous pneumothorax and spontaneous pneumomediastinum. CONDITION ON DISCHARGE:  Improved. ACTIVITY:  Ad kostas. FOLLOWUP:  Followup is with Dr. Maci Nguyễn on  as arranged. He will also follow up with his primary care provider in one week, the patient will arrange. DISCHARGE MEDICATIONS:  Tylenol 650 mg by mouth every 4 hours as needed for pain. DIET:  Regular. DISPOSITION:  Home.     Total Discharge time 35 minutes    Cecile Oliver MD      [id number]/  D:  2020 20:46  T:  2020 2:59  JOB #:  [id number]

## 2021-11-08 NOTE — ED NOTES
Sees Dr Elias Almonte from cardiology  Stress test was fine   TRANSFER - ED to INPATIENT REPORT:    SBAR report made available to receiving floor on this patient being transferred to [address] [place] [address] ([facility])  for routine progression of care       Admitting diagnosis Pneumothorax [J93.9]  Pneumomediastinum ([address] [J98.2]    Information from the following report(s) SBAR was made available to receiving floor. Lines:   Peripheral IV 11/30/19 Right Antecubital (Active)   Site Assessment Clean, dry, & intact 11/30/2019 12:24 PM   Phlebitis Assessment 0 11/30/2019 12:24 PM   Infiltration Assessment 0 11/30/2019 12:24 PM   Dressing Status Clean, dry, & intact 11/30/2019 12:24 PM        Medication list updated today    Opportunity for questions and clarification was provided.       Patient is oriented to time, place, person and situation   Patient is  continent and ambulatory without assist     Valuables transported with patient     Patient transported with:   Monitor  O2 @ 15 liters  Tech    MAP (Monitor): 85 =Monitored (most recent)  Vitals w/ MEWS Score (last day)     Date/Time MEWS Score Pulse Resp Temp BP Level of Consciousness SpO2    11/30/19 1945  1  84  16  98.2 °F (36.8 °C)  120/73  Alert  100 %    11/30/19 1930    85  13    141/73    100 %    11/30/19 1915    97  17    133/68    100 %    11/30/19 1900    85  16    136/83    100 %    11/30/19 1845    83  8    137/84    100 %    11/30/19 1830    91  14    140/77    100 %    11/30/19 1800    94  15    138/80    100 %    11/30/19 1700    76  11    133/83    100 %    11/30/19 1645    78  15    123/70    100 %    11/30/19 1615    73  11    129/89    100 %    11/30/19 1545    85  16    132/85    100 %    11/30/19 1515    88  14    131/88    100 %    11/30/19 1500    90  13    133/81    100 %    11/30/19 1449    85  15  98.7 °F (37.1 °C)    Alert  100 %    11/30/19 1445    84  15    133/78        11/30/19 1440              100 %    11/30/19 1430    80  15    133/75        11/30/19 1400   80  18    133/70        11/30/19 1345    81  20    135/74        11/30/19 1330    77  18    136/78    100 %    11/30/19 1322          131/75        11/30/19 1300    81  19    145/84        11/30/19 1052  1  94  16  98.3 °F (36.8 °C)  (!) 135/94  Alert  100 %